# Patient Record
Sex: MALE | Race: BLACK OR AFRICAN AMERICAN | NOT HISPANIC OR LATINO | URBAN - METROPOLITAN AREA
[De-identification: names, ages, dates, MRNs, and addresses within clinical notes are randomized per-mention and may not be internally consistent; named-entity substitution may affect disease eponyms.]

---

## 2021-08-10 ENCOUNTER — OFFICE VISIT (OUTPATIENT)
Dept: URBAN - METROPOLITAN AREA SURGERY CENTER 20 | Facility: SURGERY CENTER | Age: 56
End: 2021-08-10
Payer: COMMERCIAL

## 2021-08-10 DIAGNOSIS — Z12.11 COLON CANCER SCREENING: ICD-10-CM

## 2021-08-10 PROCEDURE — G8907 PT DOC NO EVENTS ON DISCHARG: HCPCS | Performed by: INTERNAL MEDICINE

## 2021-08-10 PROCEDURE — 45378 DIAGNOSTIC COLONOSCOPY: CPT | Performed by: INTERNAL MEDICINE

## 2025-07-15 ENCOUNTER — DASHBOARD ENCOUNTERS (OUTPATIENT)
Age: 60
End: 2025-07-15

## 2025-07-15 ENCOUNTER — OFFICE VISIT (OUTPATIENT)
Dept: URBAN - METROPOLITAN AREA CLINIC 84 | Facility: CLINIC | Age: 60
End: 2025-07-15
Payer: COMMERCIAL

## 2025-07-15 DIAGNOSIS — K62.89 RECTAL PAIN: ICD-10-CM

## 2025-07-15 DIAGNOSIS — K60.2 ANAL FISSURE: ICD-10-CM

## 2025-07-15 DIAGNOSIS — K64.8 INTERNAL HEMORRHOID: ICD-10-CM

## 2025-07-15 PROCEDURE — 99204 OFFICE O/P NEW MOD 45 MIN: CPT | Performed by: INTERNAL MEDICINE

## 2025-07-15 RX ORDER — ATORVASTATIN CALCIUM, FILM COATED 10 MG/1
TAKE 1 TABLET BY MOUTH EVERY DAY TABLET ORAL
Qty: 90 EACH | Refills: 0 | Status: ACTIVE | COMMUNITY

## 2025-07-15 RX ORDER — LIDOCAINE 50 MG/G
1 APPLICATION CREAM TOPICAL
Qty: 30 GRAM | Refills: 1 | OUTPATIENT
Start: 2025-07-15 | End: 2025-09-13

## 2025-07-15 RX ORDER — AMLODIPINE BESYLATE 10 MG/1
TABLET ORAL
Qty: 90 TABLET | Status: ACTIVE | COMMUNITY

## 2025-07-15 RX ORDER — OMEPRAZOLE 40 MG/1
CAPSULE, DELAYED RELEASE ORAL
Qty: 90 CAPSULE | Status: ON HOLD | COMMUNITY

## 2025-07-15 RX ORDER — NITROGLYCERIN 4 MG/G
AS DIRECTED OINTMENT RECTAL TWICE A DAY
Qty: 30 GM | Refills: 1 | OUTPATIENT
Start: 2025-07-15

## 2025-07-15 NOTE — PHYSICAL EXAM GASTROINTESTINAL
Abdomen , soft, nontender, nondistended , no guarding or rigidity , no masses palpable , normal bowel sounds , Liver and Spleen , no hepatomegaly present , no hepatosplenomegaly , liver nontender , spleen not palpable .  Rectal.  G1 internal hemorrhoids.  Rectal spasm.  Posterior anal fissure

## 2025-07-15 NOTE — HPI-TODAY'S VISIT:
This patient was referred by Dr. Amada Cuevas for an evaluation of rectal pain.  A copy of this will be sent to the referring provider.  Colonoscopy in 2021 was normal  with a 10 year recall.  He has a lot of pain when he has a BM.  He has occasional blood on the tisue.  He has some discharge as well.  He has regualr BM's.  He has some mild strain.  He was having firm stool but this has decreased since he started stool softeners.  The pain is a tearing pain.  He's ahd the symptoms intermittenty for a few months.  He denies anorexia or weight loss.  He denies abdominal pain.  He denies UGI symptoms.  We did band his hemorrhoids many years ago